# Patient Record
Sex: MALE | Employment: FULL TIME | URBAN - METROPOLITAN AREA
[De-identification: names, ages, dates, MRNs, and addresses within clinical notes are randomized per-mention and may not be internally consistent; named-entity substitution may affect disease eponyms.]

---

## 2020-05-18 RX ORDER — ZOLEDRONIC ACID 5 MG/100ML
5 INJECTION, SOLUTION INTRAVENOUS ONCE
Status: DISCONTINUED | OUTPATIENT
Start: 2020-05-21 | End: 2020-05-21

## 2020-05-18 RX ORDER — SODIUM CHLORIDE 9 MG/ML
25 INJECTION, SOLUTION INTRAVENOUS CONTINUOUS
Status: DISPENSED | OUTPATIENT
Start: 2020-05-21 | End: 2020-05-21

## 2020-05-21 ENCOUNTER — HOSPITAL ENCOUNTER (OUTPATIENT)
Dept: INFUSION THERAPY | Age: 60
Discharge: HOME | End: 2020-05-21

## 2020-06-05 RX ORDER — ZOLEDRONIC ACID 5 MG/100ML
5 INJECTION, SOLUTION INTRAVENOUS ONCE
Status: COMPLETED | OUTPATIENT
Start: 2020-06-12 | End: 2020-06-12

## 2020-06-05 RX ORDER — SODIUM CHLORIDE 9 MG/ML
25 INJECTION, SOLUTION INTRAVENOUS CONTINUOUS
Status: DISPENSED | OUTPATIENT
Start: 2020-06-12 | End: 2020-06-12

## 2020-06-12 ENCOUNTER — HOSPITAL ENCOUNTER (OUTPATIENT)
Dept: INFUSION THERAPY | Age: 60
Discharge: HOME | End: 2020-06-12
Payer: COMMERCIAL

## 2020-06-12 VITALS
SYSTOLIC BLOOD PRESSURE: 144 MMHG | WEIGHT: 154 LBS | DIASTOLIC BLOOD PRESSURE: 86 MMHG | OXYGEN SATURATION: 97 % | HEART RATE: 68 BPM | RESPIRATION RATE: 18 BRPM | TEMPERATURE: 97.9 F

## 2020-06-12 LAB
ANION GAP BLD CALC-SCNC: 15 MMOL/L (ref 10–20)
BUN BLD-MCNC: 21 MG/DL (ref 9–20)
CA-I BLD-MCNC: 1.23 MMOL/L (ref 1.12–1.32)
CHLORIDE BLD-SCNC: 103 MMOL/L (ref 98–107)
CO2 BLD-SCNC: 27 MMOL/L (ref 21–32)
CREAT BLD-MCNC: 1.1 MG/DL (ref 0.6–1.3)
GLUCOSE BLD-MCNC: 108 MG/DL (ref 65–100)
HCT VFR BLD CALC: 41 % (ref 36.6–50.3)
POTASSIUM BLD-SCNC: 4 MMOL/L (ref 3.5–5.1)
SERVICE CMNT-IMP: ABNORMAL
SODIUM BLD-SCNC: 141 MMOL/L (ref 136–145)

## 2020-06-12 PROCEDURE — 74011000258 HC RX REV CODE- 258: Performed by: INTERNAL MEDICINE

## 2020-06-12 PROCEDURE — 80047 BASIC METABLC PNL IONIZED CA: CPT

## 2020-06-12 PROCEDURE — 96374 THER/PROPH/DIAG INJ IV PUSH: CPT

## 2020-06-12 PROCEDURE — 74011250636 HC RX REV CODE- 250/636: Performed by: INTERNAL MEDICINE

## 2020-06-12 RX ORDER — CHOLECALCIFEROL (VITAMIN D3) 125 MCG
50 CAPSULE ORAL DAILY
COMMUNITY

## 2020-06-12 RX ORDER — BISMUTH SUBSALICYLATE 262 MG
1 TABLET,CHEWABLE ORAL DAILY
COMMUNITY

## 2020-06-12 RX ADMIN — ZOLEDRONIC ACID 5 MG: 5 INJECTION, SOLUTION INTRAVENOUS at 13:26

## 2020-06-12 RX ADMIN — SODIUM CHLORIDE 25 ML/HR: 900 INJECTION, SOLUTION INTRAVENOUS at 13:24

## 2020-06-12 NOTE — PROGRESS NOTES
[facility] VISIT NOTE    9290. Pt arrived at [facility] ambulatory and in no distress for Reclast.  Assessment completed, pt with no complaints or concerns. 24g PIV placed in Left AC. Positive blood return noted and labs drawn. Istat performed and within parameters to treat. Medications received:  NS KVO  Reclast IV    Patient declined to stay for observation post infusion. Tolerated treatment well, no adverse reaction noted. PIV removed at discharge, no s/s of infiltration noted. Patient Vitals for the past 12 hrs:   Temp Pulse Resp BP SpO2   06/12/20 1344  68  144/86    06/12/20 1256 97.9 °F (36.6 °C) 76 18 145/87 97 %     Recent Results (from the past 12 hour(s))   POC CHEM8    Collection Time: 06/12/20  1:11 PM   Result Value Ref Range    Calcium, ionized (POC) 1.23 1.12 - 1.32 mmol/L    Sodium (POC) 141 136 - 145 mmol/L    Potassium (POC) 4.0 3.5 - 5.1 mmol/L    Chloride (POC) 103 98 - 107 mmol/L    CO2 (POC) 27 21 - 32 mmol/L    Anion gap (POC) 15 10 - 20 mmol/L    Glucose (POC) 108 (H) 65 - 100 mg/dL    BUN (POC) 21 (H) 9 - 20 mg/dL    Creatinine (POC) 1.1 0.6 - 1.3 mg/dL    GFRAA, POC >60 >60 ml/min/1.73m2    GFRNA, POC >60 >60 ml/min/1.73m2    Hematocrit (POC) 41 36.6 - 50.3 %    Comment Comment Not Indicated. 1345. D/C'd from [facility] ambulatory and in no distress.

## 2021-07-08 RX ORDER — ZOLEDRONIC ACID 5 MG/100ML
5 INJECTION, SOLUTION INTRAVENOUS ONCE
Status: COMPLETED | OUTPATIENT
Start: 2021-07-15 | End: 2021-07-15

## 2021-07-15 ENCOUNTER — HOSPITAL ENCOUNTER (OUTPATIENT)
Dept: INFUSION THERAPY | Age: 61
Discharge: HOME | End: 2021-07-15
Payer: COMMERCIAL

## 2021-07-15 VITALS
RESPIRATION RATE: 16 BRPM | DIASTOLIC BLOOD PRESSURE: 82 MMHG | SYSTOLIC BLOOD PRESSURE: 141 MMHG | TEMPERATURE: 98.7 F | HEIGHT: 69 IN | OXYGEN SATURATION: 97 % | WEIGHT: 149.74 LBS | BODY MASS INDEX: 22.18 KG/M2 | HEART RATE: 70 BPM

## 2021-07-15 PROCEDURE — 96374 THER/PROPH/DIAG INJ IV PUSH: CPT

## 2021-07-15 PROCEDURE — 74011250636 HC RX REV CODE- 250/636: Performed by: INTERNAL MEDICINE

## 2021-07-15 RX ADMIN — ZOLEDRONIC ACID 5 MG: 5 INJECTION, SOLUTION INTRAVENOUS at 11:53

## 2021-07-15 NOTE — PROGRESS NOTES
Outpatient Infusion Center Short Visit Progress Note    4030 Patient admitted to [facility] for [address] ambulatory in stable condition. Assessment completed. No new concerns voiced. Covid Screening      1. Do you have any symptoms of COVID-19? SOB, coughing, fever, or generally not feeling well ? NO  2. Have you been exposed to COVID-19 recently? NO  3. Have you had any recent contact with family/friend that has a pending COVID test? NO    Vital Signs:  Patient Vitals for the past 24 hrs:   Temp Pulse Resp BP SpO2   07/15/21 1211  70 16 (!) 141/82    07/15/21 1119 98.7 °F (37.1 °C) 72 16 126/83 97 %       Left arm PIV #24 with positive blood return. Labs reviewed from 7/8/21. Ok to proceed. Calcium 9.4. Medications:  Medications Administered     zoledronic acid (RECLAST) IVPB 5 mg     Admin Date  07/15/2021 Action  New Bag Dose  5 mg Rate  400 mL/hr Route  IntraVENous Administered By  Kriss Simpson RN                Patient tolerated treatment well. Patient discharged from [facility] 58 ambulatory in no distress at 1215. No further appointments needed. Confirmed by patient.

## 2022-08-23 RX ORDER — SODIUM CHLORIDE 9 MG/ML
25 INJECTION, SOLUTION INTRAVENOUS CONTINUOUS
Status: DISPENSED | OUTPATIENT
Start: 2022-08-30 | End: 2022-08-30

## 2022-08-23 RX ORDER — ZOLEDRONIC ACID 5 MG/100ML
5 INJECTION, SOLUTION INTRAVENOUS ONCE
Status: COMPLETED | OUTPATIENT
Start: 2022-08-30 | End: 2022-08-30

## 2022-08-30 ENCOUNTER — HOSPITAL ENCOUNTER (OUTPATIENT)
Dept: INFUSION THERAPY | Age: 62
Discharge: HOME | End: 2022-08-30
Payer: COMMERCIAL

## 2022-08-30 VITALS
BODY MASS INDEX: 23.4 KG/M2 | OXYGEN SATURATION: 96 % | SYSTOLIC BLOOD PRESSURE: 129 MMHG | RESPIRATION RATE: 18 BRPM | HEART RATE: 71 BPM | WEIGHT: 158 LBS | TEMPERATURE: 98.3 F | DIASTOLIC BLOOD PRESSURE: 83 MMHG | HEIGHT: 69 IN

## 2022-08-30 LAB
ANION GAP BLD CALC-SCNC: 11 MMOL/L (ref 10–20)
CA-I BLD-MCNC: 1.23 MMOL/L (ref 1.12–1.32)
CHLORIDE BLD-SCNC: 106 MMOL/L (ref 98–107)
CO2 BLD-SCNC: 28.3 MMOL/L (ref 21–32)
CREAT BLD-MCNC: 1.03 MG/DL (ref 0.6–1.3)
GLUCOSE BLD-MCNC: 65 MG/DL (ref 65–100)
POTASSIUM BLD-SCNC: 4.5 MMOL/L (ref 3.5–5.1)
SERVICE CMNT-IMP: NORMAL
SODIUM BLD-SCNC: 144 MMOL/L (ref 136–145)

## 2022-08-30 PROCEDURE — 96374 THER/PROPH/DIAG INJ IV PUSH: CPT

## 2022-08-30 PROCEDURE — 74011250636 HC RX REV CODE- 250/636: Performed by: INTERNAL MEDICINE

## 2022-08-30 PROCEDURE — 80047 BASIC METABLC PNL IONIZED CA: CPT

## 2022-08-30 RX ADMIN — ZOLEDRONIC ACID 5 MG: 5 INJECTION, SOLUTION INTRAVENOUS at 12:18

## 2022-08-30 RX ADMIN — SODIUM CHLORIDE 25 ML/HR: 9 INJECTION, SOLUTION INTRAVENOUS at 12:15

## 2022-08-30 NOTE — PROGRESS NOTES
Outpatient Infusion Center Short Visit Progress Note    Patient admitted to [facility] for [address] ambulatory in stable condition. Assessment completed. No new concerns voiced. LAC 24G PIV placed without difficulty, lab drawn and sent to processing. Pt denied no recent invasive dental procedure. Covid Screening      1. Do you have any symptoms of COVID-19? SOB, coughing, fever, or generally not feeling well ? NO  2. Have you been exposed to COVID-19 recently? NO  3. Have you had any recent contact with family/friend that has a pending COVID test? NO    Vital Signs:  Visit Vitals  /83   Pulse 71   Temp 98.3 °F (36.8 °C)   Resp 18   Ht 5' 9\" (1.753 m)   Wt 71.7 kg (158 lb)   SpO2 96%   BMI 23.33 kg/m²     Patient Vitals for the past 12 hrs:   Temp Pulse Resp BP SpO2   08/30/22 1235 98.3 °F (36.8 °C) 71 18 129/83 96 %   08/30/22 1151 97.9 °F (36.6 °C) 69 18 121/78 96 %            Lab Results:  Recent Results (from the past 12 hour(s))   POC CHEM8    Collection Time: 08/30/22 12:07 PM   Result Value Ref Range    Calcium, ionized (POC) 1.23 1.12 - 1.32 mmol/L    Sodium (POC) 144 136 - 145 mmol/L    Potassium (POC) 4.5 3.5 - 5.1 mmol/L    Chloride (POC) 106 98 - 107 mmol/L    CO2 (POC) 28.3 21 - 32 mmol/L    Anion gap (POC) 11 10 - 20 mmol/L    Glucose (POC) 65 65 - 100 mg/dL    Creatinine (POC) 1.03 0.6 - 1.3 mg/dL    GFRAA, POC >60 >60 ml/min/1.73m2    GFRNA, POC >60 >60 ml/min/1.73m2    Comment Comment Not Indicated. Medications:  Medications Administered       0.9% sodium chloride infusion       Admin Date  08/30/2022 Action  New Bag Dose  25 mL/hr Rate  25 mL/hr Route  IntraVENous Administered By  Latoya Valverde, RN              zoledronic acid (RECLAST) IVPB 5 mg       Admin Date  08/30/2022 Action  New Bag Dose  5 mg Rate  400 mL/hr Route  IntraVENous Administered By  Latoya Valverde RN                      AVS given      Patient tolerated treatment well. PIV flushed and removed per protocol.  Patient discharged from [facility] 58 ambulatory in no distress at 1240. Patient aware of next appointment. No future appointments.

## 2023-03-06 ENCOUNTER — TELEPHONE (OUTPATIENT)
Dept: NEUROLOGY | Age: 63
End: 2023-03-06

## 2023-03-06 ENCOUNTER — OFFICE VISIT (OUTPATIENT)
Dept: NEUROLOGY | Age: 63
End: 2023-03-06
Payer: COMMERCIAL

## 2023-03-06 VITALS
RESPIRATION RATE: 14 BRPM | BODY MASS INDEX: 23.7 KG/M2 | HEIGHT: 69 IN | SYSTOLIC BLOOD PRESSURE: 141 MMHG | HEART RATE: 71 BPM | DIASTOLIC BLOOD PRESSURE: 92 MMHG | WEIGHT: 160 LBS | OXYGEN SATURATION: 96 %

## 2023-03-06 DIAGNOSIS — R47.89 WORD FINDING DIFFICULTY: ICD-10-CM

## 2023-03-06 DIAGNOSIS — R41.3 MEMORY DIFFICULTIES: Primary | ICD-10-CM

## 2023-03-06 PROCEDURE — 99204 OFFICE O/P NEW MOD 45 MIN: CPT | Performed by: PSYCHIATRY & NEUROLOGY

## 2023-03-06 RX ORDER — ZOLEDRONIC ACID 5 MG/100ML
5 INJECTION, SOLUTION INTRAVENOUS
COMMUNITY

## 2023-03-06 RX ORDER — ATORVASTATIN CALCIUM 10 MG/1
TABLET, FILM COATED ORAL
COMMUNITY
Start: 2023-01-15

## 2023-03-06 NOTE — PROGRESS NOTES
[facility] Neurology Clinics and [address] at [facility] Neurology Clinics at [address] [facility] [address] [place], [address] [facility] [address] [facility], [address] [place] [address]  [phone] Office  [phone]           Referring: Mitzi Herrmann MD  [address],  [address] [facility]     Chief Complaint   Patient presents with    New Patient    Memory Loss     Always had difficulty with names and faces but  gradually getting worse. 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 He notes he is never really been great with names or faces but he has noticed worsening of such. 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 He has no difficulty with long-term memory. Short-term issues are more difficult. His wife has noticed as well. She had encouraged him to mention this to Dr. Wilson Earl on his last physical examination. Dr. Wilson Earl suggested a polysomnogram as the patient does snore and has a mouthguard but it is uncomfortable so he does not wear it regularly. He gets the results of that polysomnogram this afternoon. He does also note that his wife will tell him about the time they went to a restaurant and he has no recollection of that. He has no difficulty with his teaching duties. He has memorize lines for place but the last play he was in was in February 2020 prior to the [place] pandemic. He has no excess alcohol use and drinking a glass of scotch maybe 1 or 2 times per week. He does have significant family history of dementia and that his father was 80 and had dementia. His paternal grandmother had dementia and was in her 80s when she had to go to the nursing home.   Mother and maternal grandparents were fine. Interestingly he did have a TIA in 1997 manifest as transient diplopia. He was checked out in the emergency department and then follow-up with neurology who did an MRI and diagnosed an AVM but then and subsequent MRIs it was never seen. He has not had any recent imaging. Notes from Dr. Gianna Lopes are kindly forwarded are reviewed today and these include laboratory analysis from December 2022  CBC unremarkable  Metabolic panel unremarkable  Vitamin D normal  Liver function normal  LDL 77  TSH normal  B12 normal  Folate normal  Past Medical History:   Diagnosis Date    Elevated LFTs     Hypogonadism male     Memory impairment     Osteoporosis     Prostatitis        History reviewed. No pertinent surgical history. Current Outpatient Medications   Medication Sig Dispense Refill    atorvastatin (LIPITOR) 10 mg tablet 1/2 TABLET ORALLY ONCE A DAY FOR CHOLESTEROL 90      zoledronic acid (RECLAST) 5 mg/100 mL pgbk 5 mg by IntraVENous route Once a year. cholecalciferol, vitamin D3, 50 mcg (2,000 unit) tab Take 50 mcg by mouth daily. multivitamin (ONE A DAY) tablet Take 1 Tab by mouth daily. Allergies   Allergen Reactions    Other Food Hives     Red meat    Alpha-Gal (Galactose-Alpha-1,3-Galactose) Hives    Erythromycin Hives     Unknown from childhood  Unknown from childhood      Other Medication Hives     arythromycin    Penicillins Hives       Social History     Tobacco Use    Smoking status: Never    Smokeless tobacco: Never   Vaping Use    Vaping Use: Never used   Substance Use Topics    Alcohol use:  Yes     Alcohol/week: 1.0 standard drink     Types: 1 Shots of liquor per week    Drug use: Never       Family History   Problem Relation Age of Onset    COPD Mother     Hypertension Mother     Diabetes Mother     Heart Disease Mother     Prostate Cancer Father     Cancer Father         Prostate, non-fatal    No Known Problems Sister     No Known Problems Sister     No Known Problems Brother     Dementia Paternal Grandmother        Review of Systems  Pertinent positives and negatives as noted. Examination  Visit Vitals  BP (!) 141/92   Pulse 71   Resp 14   Ht 5' 9\" (1.753 m)   Wt 72.6 kg (160 lb)   SpO2 96%   BMI 23.63 kg/m²     Neurologically, he is awake, alert, oriented with normal speech, his cognition is intact to discussion of his medical history and current events. Cranial nerves intact 2-12. He has normal bulk and tone. There is no abnormal movement. There is no pronation or drift. He is able to generate full strength in the upper and lower extremities and all muscle groups to direct confrontational testing. Reflexes are symmetrical in the upper and lower extremities bilaterally. No pathologic reflexes are elicited. He has no ataxia or past pointing. Impression/Plan  Very pleasant 68-year-old gentleman with progressive memory difficulty/confusion and differential diagnosis certainly includes age-related cognitive decline versus mild cognitive impairment versus early dementing process versus processing issue versus attention versus emotional versus metabolic, structural versus vascular versus other   MRI of the brain  EEG  Carotid Doppler  Formal neurocognitive eval  We will try to arrange most of these tests at the same time given he lives in [place] and also try to get a virtual consultation with Dr. Syed Parks MD          This note was created using voice recognition software. Despite editing, there may be syntax errors.

## 2023-03-06 NOTE — PATIENT INSTRUCTIONS
[employer] Neuroscience Test Result Communication    Test results are available in [facility]. [employer] is the patient portal into our electronic health record. This feature allows patients to see diagnostic test results, immunizations, allergies, past medical and surgical history, current medications, and send messages directly to providers. Our team members at the  can provide additional information and assist with registration. The [employer] support team can be reached at [phone]. In some cases, a provider might need time to explain the results in detail during a follow-up appointment. This might include additional information or context that will help patients understand the reason for next steps in the plan of care recommended by their provider. If a patient chooses to receive diagnostic testing at an imaging center outside of the [facility], it is the patient's responsibility to bring the imaging report and disc to their [address] [place] [address] follow-up appointment. If the test results reveal anything that is particularly noteworthy, we will contact you to discuss the matter and, if necessary, schedule a follow-up appointment at an earlier date. If you have not received your test results by [employer] or other communication within 7 days, please contact our office. An inquiry can be sent to your provider using [employer]. Alternatively, appointments can be scheduled via telephone to review results. If a follow-up appointment to review results has not been scheduled, [address] office can be reached at [phone]. For appointments at our [facility] or [facility] office, please call [phone].        [address] [facility]   Statement to Patients  April 1, 2014      In an effort to ensure the large volume of patient prescription refills is processed in the most efficient and expeditious manner, we are asking our patients to assist us by calling your Pharmacy for all prescription refills, this will include also your  Mail Order Pharmacy. The pharmacy will contact our office electronically to continue the refill process. Please do not wait until the last minute to call your pharmacy. We need at least 48 hours (2days) to fill prescriptions. We also encourage you to call your pharmacy before going to  your prescription to make sure it is ready. With regard to controlled substance prescription refill requests (narcotic refills) that need to be picked up at our office, we ask your cooperation by providing us with at least 72 hours (3days) notice that you will need a refill. We will not refill narcotic prescription refill requests after 4:00pm on any weekday, Monday through Thursday, or after 2:00pm on Fridays, or on the weekends. We encourage everyone to explore another way of getting your prescription refill request processed using [employer] our patient web portal through our electronic medical record system. [employer] is an efficient and effective way to communicate your medication request directly to the office and  downloadable as an german on your smart phone . [employer] also features a review functionality that allows you to view your medication list as well as leave messages for your physician. Are you ready to get connected? If so please review the attatched instructions or speak to any of our staff to get you set up right away! Thank you so much for your cooperation. Should you have any questions please contact our Practice Administrator. Neurocognitive consult/testing procedure:    Please contact office after scheduling with one of the facilities listed below with whom you are seeing, date and time of appt and we will fax orders and notes tho that facility and schedule your follow up with our office after testing.       [facility] Neurology at [facility]  Dr. Konrad Montejo [place] [address] [facility]  (S) [address] [place] [address] Neurology at [facility]  Dr. Jennifer Salcedo Clune  [address]  (B) [phone]

## 2023-03-15 ENCOUNTER — OFFICE VISIT (OUTPATIENT)
Dept: NEUROLOGY | Age: 63
End: 2023-03-15

## 2023-03-15 DIAGNOSIS — R41.0 CONFUSION: ICD-10-CM

## 2023-03-15 DIAGNOSIS — R47.89 WORD FINDING DIFFICULTY: Primary | ICD-10-CM

## 2023-03-26 NOTE — PROCEDURES
[facility] AT [place]   Electroencephalogram Report    Procedure ID: [id number] Procedure Date: 3/15/2023   Patient Name: Michele Belcher YOB: 1960   Procedure Type: Routine Medical Record No: [id number]       An EEG is requested in this 78-year-old man to evaluate for epileptiform abnormality. Medications said to include Reclast and Lipitor    This tracing is obtained during the awake and drowsy states. During wakefulness there are intermittent runs of posteriorly dominant and symmetric low to medium amplitude 10 cps activities which attenuate with eye opening. Lower voltage faster frequency activities are seen symmetrically over the anterior head regions. There is 1 isolated sharp transients over the left temporal region    Hyperventilation is not performed    Intermittent photic stimulation induces symmetric posterior driving responses. During drowsiness, the background rhythms attenuate and are replaced with diffuse symmetric theta range activities. Later stages of sleep are not attained. Interpretation  This EEG recorded during the awake and drowsy states is normal.  The aforementioned sharp transient is not in and of itself epileptiform.     Anson Kelley MD

## 2023-04-04 ENCOUNTER — HOSPITAL ENCOUNTER (OUTPATIENT)
Dept: MRI IMAGING | Age: 63
End: 2023-04-04
Attending: PSYCHIATRY & NEUROLOGY
Payer: COMMERCIAL

## 2023-04-04 PROCEDURE — 70553 MRI BRAIN STEM W/O & W/DYE: CPT

## 2023-04-04 PROCEDURE — 74011250636 HC RX REV CODE- 250/636: Performed by: PSYCHIATRY & NEUROLOGY

## 2023-04-04 PROCEDURE — A9576 INJ PROHANCE MULTIPACK: HCPCS | Performed by: PSYCHIATRY & NEUROLOGY

## 2023-04-04 RX ADMIN — GADOTERIDOL 14 ML: 279.3 INJECTION, SOLUTION INTRAVENOUS at 11:50

## 2023-08-31 ENCOUNTER — APPOINTMENT (OUTPATIENT)
Dept: INFUSION THERAPY | Age: 63
End: 2023-08-31

## 2023-08-31 ENCOUNTER — HOSPITAL ENCOUNTER (OUTPATIENT)
Facility: HOSPITAL | Age: 63
Setting detail: INFUSION SERIES
End: 2023-08-31

## 2023-12-13 ENCOUNTER — TELEPHONE (OUTPATIENT)
Age: 63
End: 2023-12-13